# Patient Record
Sex: MALE
[De-identification: names, ages, dates, MRNs, and addresses within clinical notes are randomized per-mention and may not be internally consistent; named-entity substitution may affect disease eponyms.]

---

## 2017-03-27 ENCOUNTER — HOSPITAL ENCOUNTER (EMERGENCY)
Dept: HOSPITAL 25 - ED | Age: 32
Discharge: HOME | End: 2017-03-27
Payer: COMMERCIAL

## 2017-03-27 VITALS — DIASTOLIC BLOOD PRESSURE: 67 MMHG | SYSTOLIC BLOOD PRESSURE: 125 MMHG

## 2017-03-27 DIAGNOSIS — K08.89: ICD-10-CM

## 2017-03-27 DIAGNOSIS — K02.9: Primary | ICD-10-CM

## 2017-03-27 PROCEDURE — 99282 EMERGENCY DEPT VISIT SF MDM: CPT

## 2017-03-27 NOTE — ED
Throat Pain/Nasal Congestion





- HPI Summary


HPI Summary: 


Patient presents with three days of left upper molar pain. He fractured a tooth 

two weeks ago. His pain has waxed and waned but the last three days he has had 

to take ibuprofen and perform saltwater gargles, which have helped up until 

today. He has mild facial swelling without fever, chills or drainage. He does 

not have a dentist but is looking for one.





- History of Current Complaint


Chief Complaint: EDDentalPain


Time Seen by Provider: 03/27/17 20:13


Hx Obtained From: Patient


Onset/Duration: Gradual Onset


Severity: Moderate


Associated Signs And Symptoms: Positive: Negative


Cough: None





- Allergies/Home Medications


Allergies/Adverse Reactions: 


 Allergies











Allergy/AdvReac Type Severity Reaction Status Date / Time


 


No Known Allergies Allergy   Verified 06/05/13 16:45














PMH/Surg Hx/FS Hx/Imm Hx


Previously Healthy: Yes


Endocrine/Hematology History: 


   Denies: Hx Diabetes, Hx Thyroid Disease


Cardiovascular History: 


   Denies: Hx Hypertension


Respiratory History: 


   Denies: Hx Asthma, Hx Chronic Obstructive Pulmonary Disease (COPD)


GI History: 


   Denies: Hx Ulcer


Sensory History: 


   Denies: Hx Contacts or Glasses


Opthamlomology History: 


   Denies: Hx Contacts or Glasses


Infectious Disease History: No


Infectious Disease History: 


   Denies: Hx Clostridium Difficile, Hx Hepatitis, Hx Human Immunodeficiency 

Virus (HIV), Hx of Known/Suspected MRSA, Hx Shingles, Hx Tuberculosis, Hx Known/

Suspected VRE, Hx Known/Suspected VRSA, History Other Infectious Disease, 

Traveled Outside the  in Last 30 Days





- Family History


Known Family History: Positive: None





- Social History


Occupation: Employed Full-time


Lives: With Family


Alcohol Use: None


Substance Use Type: Reports: None


Smoking Status (MU): Never Smoked Tobacco


Have You Smoked in the Last Year: No





Review of Systems


Negative: Fever


Positive: Dental Pain.  Negative: Sore Throat


All Other Systems Reviewed And Are Negative: Yes





Physical Exam


Triage Information Reviewed: Yes


Vital Signs On Initial Exam: 


 Initial Vitals











Temp Pulse Resp BP Pulse Ox


 


 98.2 F   83   18   154/82   99 


 


 03/27/17 18:11  03/27/17 18:11  03/27/17 18:11  03/27/17 18:11  03/27/17 18:11











Vital Signs Reviewed: Yes


Appearance: Positive: Well-Appearing, No Pain Distress, Well-Nourished


Skin: Positive: Warm, Skin Color Reflects Adequate Perfusion, Dry, Soft


Head/Face: Positive: Normal Head/Face Inspection


Eyes: Positive: EOMI, JONATAN, Conjunctiva Clear


Dental: Positive: Gross Decay/Caries @, Dental Fracture @ - left upper molar.  

Negative: Percussion Tenderness @


Neck: Positive: Supple, Nontender, No Lymphadenopathy


Respiratory/Lung Sounds: Positive: Breath Sounds Present


Cardiovascular: Positive: RRR


Musculoskeletal: Negative: Edema Left, Edema Right


Neurological: Positive: Sensory/Motor Intact, Alert, Oriented to Person Place, 

Time, NV Bundle Intact Distally


Psychiatric: Positive: Affect/Mood Appropriate


AVPU Assessment: Alert





Diagnostics





- Vital Signs


 Vital Signs











  Temp Pulse Resp BP Pulse Ox


 


 03/27/17 19:11  98.3 F  80  16  120/75  98


 


 03/27/17 18:11  98.2 F  83  18  154/82  99














- Laboratory


Lab Statement: Any lab studies that have been ordered have been reviewed, and 

results considered in the medical decision making process.





EENT Course/Dx





- Differential Diagnoses


Differential Diagnoses: Dental Abscess, Dental Caries, Fractured Tooth, 

Gingivitis, Odontogenic Pain, Periodontic Abscess, Periodontic Disease





- Diagnoses


Provider Diagnoses: 


 Infected dental caries








Discharge





- Discharge Plan


Condition: Stable


Disposition: HOME


Prescriptions: 


Penicillin VK TAB* [Penicillin  mg Tab*] 500 mg PO QID #54 tab


Patient Education Materials:  Toothache (ED)


Referrals: 


Reynaldo Amaral MD [Primary Care Provider] - 


Additional Instructions: 


Please take the medication provided until it is completely gone. Use ibuprofen 

for pain and continue warm salt water gargles. Call one of the numbers provided 

to establish care with a dentist. Return to the emergency department if 

symptoms worsen.

## 2017-09-21 ENCOUNTER — HOSPITAL ENCOUNTER (EMERGENCY)
Dept: HOSPITAL 25 - ED | Age: 32
Discharge: HOME | End: 2017-09-21
Payer: COMMERCIAL

## 2017-09-21 VITALS — SYSTOLIC BLOOD PRESSURE: 122 MMHG | DIASTOLIC BLOOD PRESSURE: 77 MMHG
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
## 2017-09-21 DIAGNOSIS — S70.361A: Primary | ICD-10-CM
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
## 2017-09-21 PROCEDURE — 99281 EMR DPT VST MAYX REQ PHY/QHP: CPT

## 2017-09-21 NOTE — ED
Bite Injury/Animal





- HPI Summary


HPI Summary: 


31M presents with tick bite right upper thigh.  He states he was outside last 

night and that was when the tick would have bite.  He did not try to remove 

tick.  He denies any fever or rash.  He states he has never had lyme before. 








- History of Current Complaint


Chief Complaint: EDAnimalBite


Stated Complaint: TICK BITE


Time Seen by Provider: 09/21/17 22:51


Pain Intensity: 0





- Allergies/Home Medications


Allergies/Adverse Reactions: 


 Allergies











Allergy/AdvReac Type Severity Reaction Status Date / Time


 


No Known Allergies Allergy   Verified 09/21/17 22:49














PMH/Surg Hx/FS Hx/Imm Hx


Endocrine/Hematology History: 


   Denies: Hx Diabetes, Hx Thyroid Disease


Cardiovascular History: 


   Denies: Hx Hypertension


Respiratory History: 


   Denies: Hx Asthma, Hx Chronic Obstructive Pulmonary Disease (COPD)


GI History: 


   Denies: Hx Ulcer


Sensory History: 


   Denies: Hx Contacts or Glasses


Opthamlomology History: 


   Denies: Hx Contacts or Glasses





- Immunization History


Date of Tetanus Vaccine: utd


Date of Influenza Vaccine: none


Infectious Disease History: No


Infectious Disease History: 


   Denies: Hx Clostridium Difficile, Hx Hepatitis, Hx Human Immunodeficiency 

Virus (HIV), Hx of Known/Suspected MRSA, Hx Shingles, Hx Tuberculosis, Hx Known/

Suspected VRE, Hx Known/Suspected VRSA, History Other Infectious Disease, 

Traveled Outside the [place] in Last 30 Days





- Family History


Known Family History: Positive: None





- Social History


Alcohol Use: Rare


Substance Use Type: Reports: None


Smoking Status (MU): Never Smoked Tobacco


Have You Smoked in the Last Year: No





Review of Systems


Negative: Fever


Negative: Chest Pain


Negative: Shortness Of Breath


Positive: Other - tick bite 


All Other Systems Reviewed And Are Negative: Yes





Physical Exam


Triage Information Reviewed: Yes


Vital Signs On Initial Exam: 


 Initial Vitals











Temp Pulse Resp BP Pulse Ox


 


 97.9 F   82   16   122/77   96 


 


 09/21/17 22:46  09/21/17 22:46  09/21/17 22:46  09/21/17 22:46  09/21/17 22:46











Vital Signs Reviewed: Yes


Appearance: Positive: Well-Appearing


Skin: Positive: Warm, Dry, Other - tick on right upper thigh that is engorged


Head/Face: Positive: Normal Head/Face Inspection


Eyes: Positive: Normal, Conjunctiva Clear


Respiratory/Lung Sounds: Positive: Clear to Auscultation, Breath Sounds Present


Cardiovascular: Positive: Normal, RRR


Musculoskeletal: Positive: Strength/ROM Intact - legs





- [place] Coma Scale


Coma Scale Total: 15





Diagnostics





- Vital Signs


 Vital Signs











  Temp Pulse Resp BP Pulse Ox


 


 09/21/17 22:46  97.9 F  82  16  122/77  96














- Laboratory


Lab Statement: Any lab studies that have been ordered have been reviewed, and 

results considered in the medical decision making process.





Bite Injury Course/Dx





- Course


Course Of Treatment: 31M presents with tick bite right upper thigh.  He states 

he was outside last night and that was when the tick would have bite.  He did 

not try to remove tick.  He denies any fever or rash.  He states he has never 

had lyme before. removed tick from thigh.  will ppx with doxcycline. patient 

understands and agrees with plan.





- Diagnoses


Differential Diagnosis/HQI/PQRI: Positive: Other - tick, lyme disease


Provider Diagnosis: 


 Tick bite








Discharge





- Discharge Plan


Condition: Good


Disposition: HOME


Patient Education Materials:  Tick Bite (ED)


Referrals: 


Reynaldo Amaral MD [Primary Care Provider] - 


Additional Instructions: 


You have been prophylactically treated for Lyme disease 


Return to ED if develop any rash or signs of infection

## 2018-09-13 ENCOUNTER — HOSPITAL ENCOUNTER (EMERGENCY)
Dept: HOSPITAL 25 - UCEAST | Age: 33
Discharge: HOME | End: 2018-09-13
Payer: COMMERCIAL

## 2018-09-13 VITALS — SYSTOLIC BLOOD PRESSURE: 123 MMHG | DIASTOLIC BLOOD PRESSURE: 82 MMHG
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
## 2018-09-13 DIAGNOSIS — S63.602A: Primary | ICD-10-CM
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
## 2018-09-13 DIAGNOSIS — S60.012A: ICD-10-CM
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
## 2018-09-13 PROCEDURE — G0463 HOSPITAL OUTPT CLINIC VISIT: HCPCS

## 2018-09-13 PROCEDURE — 99211 OFF/OP EST MAY X REQ PHY/QHP: CPT

## 2018-09-13 NOTE — UC
Hand/Wrist HPI





- HPI Summary


HPI Summary: 





32-year-old male with left hand pain.  He was fishing yesterday did fall on 

uneven ground approximately 7 AM and then again around 10:30 AM onto the left 

hand with his hand outstretched.  He denies any finger numbness or elbow pain.  

He this morning woke up and is having pain and loss of range of motion in his 

left hand particularly at the base of the thumb.  No other concerns at this 

time.





- History Of Current Complaint


Chief Complaint: UCUpperExtremity


Stated Complaint: THUMB INJURY


Time Seen by Provider: 09/13/18 10:04


Hx Obtained From: Patient


Onset/Duration: Sudden Onset


Pain Intensity: 7


Associated Signs And Symptoms: Positive: Swelling





- Allergies/Home Medications


Allergies/Adverse Reactions: 


 Allergies











Allergy/AdvReac Type Severity Reaction Status Date / Time


 


No Known Allergies Allergy   Verified 09/13/18 10:00











Home Medications: 


 Home Medications





NK [No Home Medications Reported]  09/13/18 [History Confirmed 09/13/18]











PMH/Surg Hx/FS Hx/Imm Hx


Previously Healthy: Yes





- Surgical History


Surgical History: None





- Family History


Known Family History: Positive: None





- Social History


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


Alcohol Use: Rare


Substance Use Type: None


Smoking Status (MU): Never Smoked Tobacco


Have You Smoked in the Last Year: No





Review of Systems


Constitutional: Negative


Skin: Negative


Motor: Decreased ROM


Neurovascular: Negative


Musculoskeletal: Arthralgia, Decreased ROM


Neurological: Negative


Psychological: Negative


Is Patient Immunocompromised?: No


All Other Systems Reviewed And Are Negative: No





Physical Exam


Triage Information Reviewed: Yes


Appearance: Well-Appearing, No Pain Distress, Well-Nourished


Vital Signs: 


 Initial Vital Signs











Temp  98 F   09/13/18 09:56


 


Pulse  75   09/13/18 09:56


 


Resp  15   09/13/18 09:56


 


BP  123/82   09/13/18 09:56


 


Pulse Ox  99   09/13/18 09:56











Vital Signs Reviewed: Yes


ENT: Positive: Hearing grossly normal


Respiratory: Positive: No respiratory distress, No accessory muscle use


Cardiovascular: Positive: Brisk Capillary Refill


Musculoskeletal: Positive: Strength Intact, ROM Limited @ - tenderness base of 

the left thumb / MCP neg for snuff box tenderness. cap refill normal. pain with 

flexion of the thumb. normal wrist and elbow exam.


Neurological Exam: Normal


Psychological Exam: Normal


Skin Exam: Normal





Diagnostics





- Laboratory


Diagnostic Studies Completed/Ordered: xray negative





Hand/Wrist Course/Dx





- Course


Course Of Treatment: X-ray shows no acute concerns and no fracture at this time 

follow up as needed.  Advise a few days of rest ice compress elevate with anti-

inflammatories and follow up with primary care doctor if needed.





- Differential Dx/Diagnosis


Differential Diagnosis/HQI/PQRI: Contusion, Fracture, Sprain, Strain


Provider Diagnoses: Left thumb sprain / bony contusion





Discharge





- Sign-Out/Discharge


Documenting (check all that apply): Patient Departure


All imaging exams completed and their final reports reviewed: Yes





- Discharge Plan


Condition: Good


Disposition: HOME


Patient Education Materials:  Swollen Joint (ED)


Forms:  *Work Release


Referrals: 


Reynaldo Amaral MD [Primary Care Provider] - 4 Days (If needed )


Additional Instructions: 


Your xray was negative for fracture at this time. 





- Billing Disposition and Condition


Condition: GOOD


Disposition: Home

## 2018-09-13 NOTE — RAD
HISTORY: pain s/p FOOSH



COMPARISONS: None



VIEWS: 4 , Frontal, lateral, and oblique views of the left hand 



FINDINGS:



BONE DENSITY: Normal.

BONES: There is no displaced fracture.

JOINTS: There is no arthropathy.

ALIGNMENT: There is no dislocation. 

SOFT TISSUES: Unremarkable.



OTHER FINDINGS: None.



IMPRESSION: 

NO ACUTE OSSEOUS INJURY. IF SYMPTOMS PERSIST, RECOMMEND REPEAT IMAGING.

## 2019-02-21 ENCOUNTER — HOSPITAL ENCOUNTER (EMERGENCY)
Dept: HOSPITAL 25 - ED | Age: 34
LOS: 1 days | Discharge: HOME | End: 2019-02-22
Payer: COMMERCIAL

## 2019-02-21 DIAGNOSIS — K03.81: Primary | ICD-10-CM

## 2019-02-21 PROCEDURE — 99282 EMERGENCY DEPT VISIT SF MDM: CPT

## 2019-02-21 NOTE — ED
Throat Pain/Nasal Congestion





- HPI Summary


HPI Summary: 


33-year-old male presents with dental pain for the past couple days.  He states 

that he broke his teeth a couple days ago.  He states using ibuprofen and it 

seemed to get better and then it got worst.  He states yesterday the pain 

increased.  He denies any fevers.  No sore throat.  No chest pain or shortness 

breath.  No increased swelling.  denies any swelling around the eyes.  





- History of Current Complaint


Chief Complaint: EDDentalPain


Time Seen by Provider: 02/21/19 23:09





- Allergies/Home Medications


Allergies/Adverse Reactions: 


 Allergies











Allergy/AdvReac Type Severity Reaction Status Date / Time


 


No Known Allergies Allergy   Verified 02/21/19 21:53














PMH/Surg Hx/FS Hx/Imm Hx


Endocrine/Hematology History: 


   Denies: Hx Diabetes, Hx Thyroid Disease


Cardiovascular History: 


   Denies: Hx Hypertension


Respiratory History: 


   Denies: Hx Asthma, Hx Chronic Obstructive Pulmonary Disease (COPD)


GI History: 


   Denies: Hx Ulcer


Sensory History: 


   Denies: Hx Contacts or Glasses


Opthamlomology History: 


   Denies: Hx Contacts or Glasses





- Immunization History


Date of Tetanus Vaccine: utd


Date of Influenza Vaccine: none


Infectious Disease History: No


Infectious Disease History: 


   Denies: Hx Clostridium Difficile, Hx Hepatitis, Hx Human Immunodeficiency 

Virus (HIV), Hx of Known/Suspected MRSA, Hx Shingles, Hx Tuberculosis, Hx Known/

Suspected VRE, Hx Known/Suspected VRSA, History Other Infectious Disease, 

Traveled Outside the  in Last 30 Days





- Family History


Known Family History: Positive: None





- Social History


Alcohol Use: Rare


Substance Use Type: Reports: None


Smoking Status (MU): Never Smoked Tobacco


Have You Smoked in the Last Year: No





Review of Systems


Negative: Fever


Positive: Dental Pain


Negative: Chest Pain


Negative: Shortness Of Breath


All Other Systems Reviewed And Are Negative: Yes





Physical Exam


Triage Information Reviewed: Yes


Vital Signs On Initial Exam: 


 Initial Vitals











Temp Pulse Resp BP Pulse Ox


 


 98.3 F   82   16   138/98   97 


 


 02/21/19 21:50  02/21/19 21:50  02/21/19 21:50  02/21/19 21:50  02/21/19 21:50











Vital Signs Reviewed: Yes


Appearance: Positive: Well-Appearing


Skin: Positive: Warm, Dry


Head/Face: Positive: Normal Head/Face Inspection


Eyes: Positive: Normal, EOMI, JONATAN, Conjunctiva Clear


ENT: Positive: Normal ENT inspection, Pharynx normal, TMs normal


Dental: Positive: Dental Fracture @ - 17.  Negative: Abscess @


Neck: Positive: Supple, Nontender, No Lymphadenopathy


Respiratory/Lung Sounds: Positive: Clear to Auscultation, Breath Sounds Present


Cardiovascular: Positive: Normal


Musculoskeletal: Positive: Normal


Neurological: Positive: Normal


Psychiatric: Positive: Normal





Diagnostics





- Vital Signs


 Vital Signs











  Temp Pulse Resp BP Pulse Ox


 


 02/21/19 21:50  98.3 F  82  16  138/98  97














- Laboratory


Lab Statement: Any lab studies that have been ordered have been reviewed, and 

results considered in the medical decision making process.





EENT Course/Dx





- Course


Course Of Treatment: 33-year-old male presents with dental pain for the past 

couple days.  He states that he broke his teeth a couple days ago.  He states 

using ibuprofen and it seemed to get better and then it got worst.  He states 

yesterday the pain increased.  He denies any fevers.  No sore throat.  No chest 

pain or shortness breath.  No increased swelling.  denies any swelling around 

the eyes.  On exam has fractured tooth noted to tooth lower left.  No erythema 

around the tooth.  We'll give penicillin for prevent infection.  Told to follow-

up with dentist.  Patient understands agrees plan.





- Differential Diagnoses


Differential Diagnoses: Dental Abscess, Dental Caries, Fractured Tooth





- Diagnoses


Provider Diagnoses: 


 Tooth fracture








Discharge





- Sign-Out/Discharge


Documenting (check all that apply): Patient Departure


Patient Received Moderate/Deep Sedation with Procedure: No





- Discharge Plan


Condition: Good


Disposition: HOME


Prescriptions: 


Penicillin VK TAB* [Penicillin  mg Tab*] 500 mg PO QID #27 tab


Patient Education Materials:  Toothache (ED)


Referrals: 


Reynaldo Amaral MD [Primary Care Provider] - 


Additional Instructions: 


Take antibiotics: 4 times a day for 7 days, first dose given in ED


Use ibuprofen or tyenlol every 6 hours


Avoid hard, crunchy food until seen by dentist


Follow up with dentist as soon as possible


Return to ED if develop fever, shortness of breath, pain with eye movement or 

swelling around eye








- Billing Disposition and Condition


Condition: GOOD


Disposition: Home





Images





- Images


Dental: 


  __________________________














  __________________________





 1 - fracture

## 2019-02-22 VITALS — SYSTOLIC BLOOD PRESSURE: 131 MMHG | DIASTOLIC BLOOD PRESSURE: 72 MMHG
